# Patient Record
Sex: MALE | Race: OTHER | URBAN - METROPOLITAN AREA
[De-identification: names, ages, dates, MRNs, and addresses within clinical notes are randomized per-mention and may not be internally consistent; named-entity substitution may affect disease eponyms.]

---

## 2020-09-03 ENCOUNTER — EMERGENCY (EMERGENCY)
Facility: HOSPITAL | Age: 30
LOS: 1 days | Discharge: HOME | End: 2020-09-03
Attending: EMERGENCY MEDICINE
Payer: COMMERCIAL

## 2020-09-03 VITALS
OXYGEN SATURATION: 98 % | TEMPERATURE: 100 F | SYSTOLIC BLOOD PRESSURE: 127 MMHG | HEART RATE: 80 BPM | WEIGHT: 169.98 LBS | DIASTOLIC BLOOD PRESSURE: 76 MMHG | RESPIRATION RATE: 18 BRPM | HEIGHT: 65 IN

## 2020-09-03 PROCEDURE — 73610 X-RAY EXAM OF ANKLE: CPT | Mod: 26,LT

## 2020-09-03 PROCEDURE — 99284 EMERGENCY DEPT VISIT MOD MDM: CPT | Mod: 25

## 2020-09-03 PROCEDURE — T1013: CPT

## 2020-09-03 PROCEDURE — 73610 X-RAY EXAM OF ANKLE: CPT

## 2020-09-03 PROCEDURE — 73590 X-RAY EXAM OF LOWER LEG: CPT | Mod: 26,LT

## 2020-09-03 PROCEDURE — 73590 X-RAY EXAM OF LOWER LEG: CPT

## 2020-09-03 PROCEDURE — 29505 APPLICATION LONG LEG SPLINT: CPT | Mod: LT

## 2020-09-03 PROCEDURE — 29505 APPLICATION LONG LEG SPLINT: CPT

## 2020-09-03 RX ORDER — IBUPROFEN 200 MG
600 TABLET ORAL ONCE
Refills: 0 | Status: COMPLETED | OUTPATIENT
Start: 2020-09-03 | End: 2020-09-03

## 2020-09-03 RX ORDER — IBUPROFEN 200 MG
1 TABLET ORAL
Qty: 28 | Refills: 0
Start: 2020-09-03 | End: 2020-09-09

## 2020-09-03 RX ADMIN — Medication 600 MILLIGRAM(S): at 20:50

## 2020-09-03 NOTE — ED PROVIDER NOTE - NSFOLLOWUPINSTRUCTIONS_ED_ALL_ED_FT
- Ibuprofen/acetaminophen for pain.  - Rest.   - Ice.   - Elevate extremity.   - Ambulate using crutches.  - No weight bearing.   - Please call tomorrow to schedule follow up appointment with orthopedist.  - Please call to schedule follow up appointment with your primary care physician within 24-48 hours.  - Please seek immediate medical attention for any new/worsening, signs/symptoms, or concerns.    Feel better!     Fracture    A fracture is a break in one of your bones. This can occur from a variety of injuries, especially traumatic ones. Symptoms include pain, bruising, or swelling. Do not use the injured limb. If a fracture is in one of the bones below your waist, do not put weight on that limb unless instructed to do so by your healthcare provider. Crutches or a cane may have been provided. A splint or cast may have been applied by your health care provider. Make sure to keep it dry and follow up with an orthopedist as instructed.    SEEK IMMEDIATE MEDICAL CARE IF YOU HAVE ANY OF THE FOLLOWING SYMPTOMS: numbness, tingling, increasing pain, or weakness in any part of the injured limb.     - Ibuprofeno / acetaminofeno para el dolor.  - [place].  - Hielo.  - Elevar extremidad.  - Deambular con muletas.  - No soporta peso.  - Llame mañana para programar kary guzman de seguimiento con el ortopedista.  - Llame para programar kary guzman de seguimiento con chong médico de atención primaria dentro de las 24 a 48 horas.  - Busque atención médica inmediata ante cualquier nuevo / empeoramiento, signos / síntomas o inquietudes.    ¡Sentirse mejor!    Fractura    Kary fractura es la rotura de nbuia de maximus huesos. [place] puede ocurrir por kary variedad de lesiones, especialmente traumáticas. Los síntomas incluyen dolor, hematomas o hinchazón. No utilice la extremidad lesionada. Si hay kary fractura en nubia de los huesos debajo de chong cintura, no ponga peso sobre luan extremidad a menos que chong proveedor de atención médica se lo indique. Es posible que se hayan proporcionado muletas o un bastón. Es posible que chong proveedor de atención médica le haya colocado kary férula o un yeso. Asegúrese de mantenerlo seco y pop un seguimiento con un ortopedista según las instrucciones.    BUSQUE ATENCIÓN MÉDICA INMEDIATA SI TIENE ALGUNO DE LOS SIGUIENTES SÍNTOMAS: entumecimiento, hormigueo, aumento del dolor o debilidad en cualquier parte de la extremidad lesionada.

## 2020-09-03 NOTE — ED PROVIDER NOTE - PHYSICAL EXAMINATION
General: In NAD, non-toxic appearing; well nourished/developed.  Skin: No rashes or lesions. Warm, dry, color normal for race.   Cardio: No lifts, heaves, visible pulsations. No thrills. Rate and rhythm regular, S1 & S2 clear. No audible murmur, gallop, or rub.  PV: Pulses: b/l 2+ DP, PT. Capillary refill <2 seconds.  Resp: Normal AP to lateral diameter, symmetrical excursion b/l. Breath sounds vesicular, symmetrical and without rales, rhonchi or wheezing b/l.  MSK/Neuro: A&Ox3. No deformity. +Left fibula head/proximal fibula tenderness. +Tenderness to left lateral malleolus > medial. No tenderness to ATFL. No tenderness to base of 5th metatarsal FROM. Global strength 5/5. Ambulating without difficulty.

## 2020-09-03 NOTE — ED PROVIDER NOTE - CLINICAL SUMMARY MEDICAL DECISION MAKING FREE TEXT BOX
30 yo male no PMHx presents to ED c/o left ankle pain x1 day s/p twisting injury. Unable to bear weight. XR to evaluate for fracture vs. sprain.

## 2020-09-03 NOTE — ED ADULT TRIAGE NOTE - CHIEF COMPLAINT QUOTE
Pt states, "I was running and I think I twisted my left ankle". Limited ROM. +Swelling and redness to left ankle. Pt states he has been icing his ankle with minimal relief. Pt states he is unable to bear weight on the left side. Denies any medical hx.

## 2020-09-03 NOTE — ED PROVIDER NOTE - ATTENDING CONTRIBUTION TO CARE
The patient seen and examined    Proximal fibular fx    I, Mick Alcala, performed the initial face to face bedside interview with this patient regarding history of present illness, review of symptoms and relevant past medical, social and family history.  I completed an independent physical examination.  I was the initial provider who evaluated this patient. I have signed out the follow up of any pending tests (i.e. labs, radiological studies) to the ACP.  I have communicated the patient’s plan of care and disposition with the ACP.

## 2020-09-03 NOTE — ED PROVIDER NOTE - CARE PLAN
Principal Discharge DX:	Ankle pain Principal Discharge DX:	Fibula fracture  Secondary Diagnosis:	Ankle sprain

## 2020-09-03 NOTE — ED PROVIDER NOTE - PATIENT PORTAL LINK FT
You can access the FollowMyHealth Patient Portal offered by [facility] by registering at the following website: [email or web address]. By joining [facility]’s FollowMyHealth portal, you will also be able to view your health information using other applications (apps) compatible with our system.

## 2020-09-03 NOTE — ED PROVIDER NOTE - CONSTITUTIONAL NEGATIVE STATEMENT, MLM
Called Dr Campos about pt cortrak. Okay to keep removed until TEES in AM.    no fever and no chills.

## 2020-09-03 NOTE — ED PROVIDER NOTE - OBJECTIVE STATEMENT
Ju. 30 yo male no PMHx presents to ED c/o left ankle pain x1 day. Patient twisted and head "crack" last night. Patient has been unable to bear weight since and is "hopping on one foot." Patient has been icing. Did not self medicate PTA. No further complaints at this time.  Ju. 28 yo male no PMHx presents to ED c/o left ankle pain x1 day. Patient twisted and heard "crack" last night. Patient has been unable to bear weight since and is "hopping on one foot." Patient has been icing. Did not self medicate PTA. No further complaints at this time.

## 2020-09-03 NOTE — ED PROVIDER NOTE - CARE PROVIDER_API CALL
Jean Shah  ORTHOPAEDIC SURGERY  [address] [place] [address]  Phone: [phone]  Fax: [phone]  Follow Up Time:

## 2020-09-08 PROBLEM — Z00.00 ENCOUNTER FOR PREVENTIVE HEALTH EXAMINATION: Status: ACTIVE | Noted: 2020-09-08

## 2020-09-09 ENCOUNTER — APPOINTMENT (OUTPATIENT)
Dept: ORTHOPEDIC SURGERY | Facility: CLINIC | Age: 30
End: 2020-09-09
Payer: COMMERCIAL

## 2020-09-09 PROCEDURE — 29405 APPL SHORT LEG CAST: CPT | Mod: LT

## 2020-09-09 PROCEDURE — 73610 X-RAY EXAM OF ANKLE: CPT | Mod: LT

## 2020-09-09 PROCEDURE — 99204 OFFICE O/P NEW MOD 45 MIN: CPT | Mod: 25

## 2020-09-09 RX ORDER — ASPIRIN 325 MG/1
325 TABLET, FILM COATED ORAL
Qty: 30 | Refills: 1 | Status: ACTIVE | COMMUNITY
Start: 2020-09-09 | End: 1900-01-01

## 2020-09-09 NOTE — PHYSICAL EXAM
[de-identified] : General: Alert and oriented x3. In no acute distress. Pleasant in nature with a normal affect. No apparent respiratory distress.\par \par L Ankle Exam\par Skin: Clean, dry, intact\par Inspection: No obvious malalignment, no swelling, no effusion; no lymphadenopathy\par Pulses: 2+ DP/PT pulses\par ROM: L Ankle 10 degrees of dorsiflexion, 40 degrees of plantarflexion, 10 degrees of subtalar motion\par Tenderness: No tenderness over the lateral malleolus, no CFL/ATFL/PTFL pain. No medial malleolus pain, no deltoid ligament pain. No heel pain.\par Stability: Negative anterior/posterior drawer.\par Strength: 5/5 TA/GS/EHL\par Neuro: In tact to light touch throughout\par Additional tests: Negative Mortons test, Negative syndesmosis squeeze test.  [de-identified] : X-rays of the left ankle and tib-fib obtained from outside facility on 9/3/2020 and reviewed by me today 09/09/2020. Revealed: Upper shaft fracture of the fibula. Slight laxity at the tibiotalar articulation associated with edema. \par \par Stress view of the left ankle was ordered obtained and reviewed by me today, 09/09/2020 , revealed: Open syndesmotic disruption, medial clear space widening.

## 2020-09-09 NOTE — HISTORY OF PRESENT ILLNESS
[FreeTextEntry1] : 29 year old male presenting with left ankle pain. The patient’s pain is noted to be a 5/10. The patient's pain began on 9/3/2020 when he fell and twisted his ankle when running. The patient went to [facility] and had x-rays. The patient presents ambulating in crutches. He presents today in a splint. He is currently taking no pain medication. No other complaints at this time.

## 2020-09-09 NOTE — DISCUSSION/SUMMARY
[de-identified] : Today I had a lengthy discussion with the patient regarding their left ankle pain. I have addressed all the patient's concerns surrounding the pathology of their condition. The splint was removed today. A discussion was had about surgery vs conservative treatment. I advised the patient to utilize 325 mg of Aspirin as instructed for blood thinning purposes. The prescription for the Aspirin was provided for the patient in the office today. I recommend that the patient be fitted for a cast. The patient was fitted for the cast in the office today. He should be non weight bearing until further notice. If the patient notices any loosening of the cast, they should call the office as soon as possible. The patient should remain out of work until reevaluation. I would like to see the patient back in the office in 1 week to reassess their condition. The patient understood and verbally agreed to the treatment plan. All of their questions were answered and they were satisfied with the visit. The patient should call the office if they have any questions or experience worsening symptoms.

## 2020-09-09 NOTE — ADDENDUM
[FreeTextEntry1] : I, Rasheed Servin, acted solely as a scribe for Dr. Kirby Elmore on this date 09/09/2020 .\par All medical record entries made by the Scribe were at my, Dr. Kirby Elmore, direction and personally dictated by me on 09/09/2020 . I have reviewed the chart and agree that the record accurately reflects my personal performance of the history, physical exam, assessment and plan. I have also personally directed, reviewed, and agreed with the chart.

## 2020-09-10 PROBLEM — Z78.9 OTHER SPECIFIED HEALTH STATUS: Chronic | Status: ACTIVE | Noted: 2020-09-03

## 2020-09-23 ENCOUNTER — APPOINTMENT (OUTPATIENT)
Dept: ORTHOPEDIC SURGERY | Facility: CLINIC | Age: 30
End: 2020-09-23
Payer: COMMERCIAL

## 2020-09-23 PROCEDURE — 29405 APPL SHORT LEG CAST: CPT | Mod: LT

## 2020-09-23 PROCEDURE — 73590 X-RAY EXAM OF LOWER LEG: CPT | Mod: LT

## 2020-09-23 PROCEDURE — 99213 OFFICE O/P EST LOW 20 MIN: CPT | Mod: 25

## 2020-09-23 PROCEDURE — 73610 X-RAY EXAM OF ANKLE: CPT | Mod: LT

## 2020-09-23 NOTE — HISTORY OF PRESENT ILLNESS
[FreeTextEntry1] : 29 year old male presenting with left ankle pain. The patient’s pain is noted to be a 0/10. He presents today in a cast. The pain and swelling are noted to be improved. The patient presents ambulating in crutches. He is currently taking no pain medication. No other complaints at this time.

## 2020-09-23 NOTE — PHYSICAL EXAM
[de-identified] : General: Alert and oriented x3. In no acute distress. Pleasant in nature with a normal affect. No apparent respiratory distress.\par \par L Ankle Exam\par Skin: Clean, dry, intact\par Inspection: No obvious malalignment, no swelling, no effusion; no lymphadenopathy\par Pulses: 2+ DP/PT pulses\par ROM: L Ankle 10 degrees of dorsiflexion, 40 degrees of plantarflexion, 10 degrees of subtalar motion\par Tenderness: No tenderness over the lateral malleolus, no CFL/ATFL/PTFL pain. No medial malleolus pain, no deltoid ligament pain. No heel pain.\par Stability: Negative anterior/posterior drawer.\par Strength: 5/5 TA/GS/EHL\par Neuro: In tact to light touch throughout\par Additional tests: Negative Mortons test, Negative syndesmosis squeeze test.  [de-identified] : 3V of the left ankle were ordered obtained and reviewed by me today, 09/23/2020 , revealed: Open syndesmotic disruption, unchanged.\par \par 2V of the left tib-fib were ordered obtained and reviewed by me today, 09/23/2020 , revealed: Stable, unchanged.

## 2020-09-23 NOTE — ADDENDUM
[FreeTextEntry1] : I, Rasheed Servin, acted solely as a scribe for Dr. Kirby Elmore on this date 09/23/2020 .\par All medical record entries made by the Scribe were at my, Dr. Kirby Elmore, direction and personally dictated by me on 09/23/2020 . I have reviewed the chart and agree that the record accurately reflects my personal performance of the history, physical exam, assessment and plan. I have also personally directed, reviewed, and agreed with the chart.

## 2020-10-07 ENCOUNTER — APPOINTMENT (OUTPATIENT)
Dept: ORTHOPEDIC SURGERY | Facility: CLINIC | Age: 30
End: 2020-10-07
Payer: COMMERCIAL

## 2020-10-07 PROCEDURE — 97760 ORTHOTIC MGMT&TRAING 1ST ENC: CPT

## 2020-10-07 PROCEDURE — 73590 X-RAY EXAM OF LOWER LEG: CPT | Mod: LT

## 2020-10-07 PROCEDURE — 73610 X-RAY EXAM OF ANKLE: CPT | Mod: LT

## 2020-10-07 PROCEDURE — 99213 OFFICE O/P EST LOW 20 MIN: CPT | Mod: 25,57

## 2020-10-07 NOTE — PHYSICAL EXAM
[de-identified] : General: Alert and oriented x3. In no acute distress. Pleasant in nature with a normal affect. No apparent respiratory distress.\par \par L Ankle Exam\par Skin: Clean, dry, intact\par Inspection: No obvious malalignment, no swelling, no effusion; no lymphadenopathy\par Pulses: 2+ DP/PT pulses\par ROM: L Ankle 10 degrees of dorsiflexion, 40 degrees of plantarflexion, 10 degrees of subtalar motion\par Tenderness: No tenderness over the lateral malleolus, no CFL/ATFL/PTFL pain. No medial malleolus pain, no deltoid ligament pain. No heel pain.\par Stability: Negative anterior/posterior drawer.\par Strength: 5/5 TA/GS/EHL\par Neuro: In tact to light touch throughout\par Additional tests: Negative Mortons test, Negative syndesmosis squeeze test.  [de-identified] : 3V of the left ankle and 2V of the left tib-fib were ordered obtained and reviewed by me today, 10/07/2020 , revealed: Open syndesmotic disruption, unchanged. Stable x-rays.

## 2020-10-07 NOTE — DISCUSSION/SUMMARY
[de-identified] : Today I had a lengthy discussion with the patient regarding their left ankle pain. I have addressed all the patient's concerns surrounding the pathology of their condition. The cast was removed in the office today. I advised the patient to remain non weight bearing for 1 more week. I advised the patient to continue taking aspirin for blood thinning purposes. I recommend that the patient utilize a CAM boot. The patient was fitted with a CAM boot in the office today. The patient was educated about the boot wear pattern and utilization, as well as the timeframe to come out of the boot. He was also given full instructions for using the boot. I recommend the patient undergo a course of physical therapy for the left ankle 2-3 times a week for a total of 6-8 weeks. A prescription was given for the physical therapy today. I would like to see the patient back in the office in 3-4 weeks to reassess their condition. The patient understood and verbally agreed to the treatment plan. All of their questions were answered and they were satisfied with the visit. The patient should call the office if they have any questions or experience worsening symptoms.

## 2020-10-07 NOTE — ADDENDUM
[FreeTextEntry1] : I, Rasheed Servin, acted solely as a scribe for Dr. Kirby Elmore on this date 10/07/2020 .\par All medical record entries made by the Scribe were at my, Dr. Kirby Elmore, direction and personally dictated by me on 10/07/2020 . I have reviewed the chart and agree that the record accurately reflects my personal performance of the history, physical exam, assessment and plan. I have also personally directed, reviewed, and agreed with the chart.

## 2020-10-07 NOTE — HISTORY OF PRESENT ILLNESS
[FreeTextEntry1] : 29 year old male presenting with left ankle pain. The patient’s pain is noted to be a 0/10. The pain and swelling are noted to be the same compared to the previous visit. The patient presents ambulating in crutches. He presents today in a cast. He is currently taking aspirin.  No other complaints at this time.

## 2020-10-28 ENCOUNTER — APPOINTMENT (OUTPATIENT)
Dept: ORTHOPEDIC SURGERY | Facility: CLINIC | Age: 30
End: 2020-10-28
Payer: COMMERCIAL

## 2020-10-28 DIAGNOSIS — S82.862A DISPLACED MAISONNEUVE'S FRACTURE OF LEFT LEG, INITIAL ENCOUNTER FOR CLOSED FRACTURE: ICD-10-CM

## 2020-10-28 PROCEDURE — 99212 OFFICE O/P EST SF 10 MIN: CPT

## 2020-10-28 PROCEDURE — 99072 ADDL SUPL MATRL&STAF TM PHE: CPT

## 2020-10-28 PROCEDURE — 73590 X-RAY EXAM OF LOWER LEG: CPT | Mod: LT

## 2020-10-28 PROCEDURE — 73610 X-RAY EXAM OF ANKLE: CPT | Mod: LT

## 2020-10-28 NOTE — DISCUSSION/SUMMARY
[de-identified] : Today I had a lengthy discussion with the patient regarding their left ankle pain. I have addressed all the patient's concerns surrounding the pathology of their condition. I recommend that the patient transition out of their CAM boot and into an ASO brace as tolerated. The patient was provided with the ASO brace in the office today. I would like to see the patient back in the office in 6-8 weeks to reassess their condition. The patient understood and verbally agreed to the treatment plan. All of their questions were answered and they were satisfied with the visit. The patient should call the office if they have any questions or experience worsening symptoms.

## 2020-10-28 NOTE — HISTORY OF PRESENT ILLNESS
[FreeTextEntry1] : 30 year old male presenting with left ankle pain. The patient’s pain is noted to be a 0/10. The patient presents wearing a CAM boot. The patient attended physical therapy.  He is currently taking no pain medication. No other complaints at this time.

## 2020-10-28 NOTE — PHYSICAL EXAM
[de-identified] : General: Alert and oriented x3. In no acute distress. Pleasant in nature with a normal affect. No apparent respiratory distress.\par \par L Ankle Exam\par Skin: Clean, dry, intact\par Inspection: No obvious malalignment, no swelling, no effusion; no lymphadenopathy\par Pulses: 2+ DP/PT pulses\par ROM: L Ankle 10 degrees of dorsiflexion, 40 degrees of plantarflexion, 10 degrees of subtalar motion\par Tenderness: No tenderness over the lateral malleolus, no CFL/ATFL/PTFL pain. No medial malleolus pain, no deltoid ligament pain. No proximal fibular pain. No heel pain.\par Stability: Negative anterior/posterior drawer.\par Strength: 5/5 TA/GS/EHL\par Neuro: In tact to light touch throughout\par Additional tests: Negative Mortons test, Negative syndesmosis squeeze test.  [de-identified] : 3V of the left ankle and 2v tib/gib were ordered obtained and reviewed by me today, 10/28/2020 , revealed: [place] fracture, healing noted, stable.

## 2020-11-25 NOTE — DISCUSSION/SUMMARY
Attempted to contact patient at both personal numbers listed to discuss echo result. Left vm x2. Patient's echo showed EF 55%, LVH, grade 1 diastolic dysfunction, mild mitral and tricuspid valve regurgitation, mild pulmonary HTN. Will attempt to call patient back to discuss details on Friday after the holiday.   
[de-identified] : Today I had a lengthy discussion with the patient regarding their left ankle pain. I have addressed all the patient's concerns surrounding the pathology of their condition. I recommend that the patient be fitted for a cast. The patient was fitted for the cast in the office today. He should be non weight bearing until further notice. If the patient notices any loosening of the cast, they should call the office as soon as possible. I would like to see the patient back in the office in 2 weeks to reassess their condition. The patient understood and verbally agreed to the treatment plan. All of their questions were answered and they were satisfied with the visit. The patient should call the office if they have any questions or experience worsening symptoms.

## 2021-02-26 ENCOUNTER — OUTPATIENT (OUTPATIENT)
Dept: OUTPATIENT SERVICES | Facility: HOSPITAL | Age: 31
LOS: 1 days | End: 2021-02-26
Payer: COMMERCIAL

## 2021-02-26 DIAGNOSIS — Z20.828 CONTACT WITH AND (SUSPECTED) EXPOSURE TO OTHER VIRAL COMMUNICABLE DISEASES: ICD-10-CM

## 2021-02-26 LAB — SARS-COV-2 RNA SPEC QL NAA+PROBE: SIGNIFICANT CHANGE UP

## 2021-02-26 PROCEDURE — U0005: CPT

## 2021-02-26 PROCEDURE — U0003: CPT

## 2021-02-26 PROCEDURE — C9803: CPT

## 2021-02-27 DIAGNOSIS — Z20.828 CONTACT WITH AND (SUSPECTED) EXPOSURE TO OTHER VIRAL COMMUNICABLE DISEASES: ICD-10-CM

## 2021-04-27 ENCOUNTER — OUTPATIENT (OUTPATIENT)
Dept: OUTPATIENT SERVICES | Facility: HOSPITAL | Age: 31
LOS: 1 days | End: 2021-04-27
Payer: COMMERCIAL

## 2021-04-27 DIAGNOSIS — Z20.828 CONTACT WITH AND (SUSPECTED) EXPOSURE TO OTHER VIRAL COMMUNICABLE DISEASES: ICD-10-CM

## 2021-04-27 LAB — SARS-COV-2 RNA SPEC QL NAA+PROBE: SIGNIFICANT CHANGE UP

## 2021-04-27 PROCEDURE — U0005: CPT

## 2021-04-27 PROCEDURE — C9803: CPT

## 2021-04-27 PROCEDURE — U0003: CPT

## 2021-04-28 DIAGNOSIS — Z20.828 CONTACT WITH AND (SUSPECTED) EXPOSURE TO OTHER VIRAL COMMUNICABLE DISEASES: ICD-10-CM

## 2021-05-24 ENCOUNTER — OUTPATIENT (OUTPATIENT)
Dept: OUTPATIENT SERVICES | Facility: HOSPITAL | Age: 31
LOS: 1 days | End: 2021-05-24
Payer: COMMERCIAL

## 2021-05-24 DIAGNOSIS — Z20.828 CONTACT WITH AND (SUSPECTED) EXPOSURE TO OTHER VIRAL COMMUNICABLE DISEASES: ICD-10-CM

## 2021-05-24 LAB — SARS-COV-2 RNA SPEC QL NAA+PROBE: SIGNIFICANT CHANGE UP

## 2021-05-24 PROCEDURE — C9803: CPT

## 2021-05-24 PROCEDURE — U0005: CPT

## 2021-05-24 PROCEDURE — U0003: CPT

## 2021-05-25 DIAGNOSIS — Z20.828 CONTACT WITH AND (SUSPECTED) EXPOSURE TO OTHER VIRAL COMMUNICABLE DISEASES: ICD-10-CM

## 2022-11-13 NOTE — ADDENDUM
[FreeTextEntry1] : I, Rasheed Servin, acted solely as a scribe for Dr. Kirby Elmore on this date 10/28/2020 .\par All medical record entries made by the Scribe were at my, Dr. Kirby Elmore, direction and personally dictated by me on 10/28/2020 . I have reviewed the chart and agree that the record accurately reflects my personal performance of the history, physical exam, assessment and plan. I have also personally directed, reviewed, and agreed with the chart. 
No adverse reaction to first time med in ED

## 2025-01-20 NOTE — ED PROVIDER NOTE - NS_EDPROVIDERDISPOUSERTYPE_ED_A_ED
Quality 130: Documentation Of Current Medications In The Medical Record: Current Medications Documented Detail Level: Detailed Attending Attestation (For Attendings USE Only)...